# Patient Record
Sex: FEMALE | Race: WHITE | Employment: PART TIME | URBAN - METROPOLITAN AREA
[De-identification: names, ages, dates, MRNs, and addresses within clinical notes are randomized per-mention and may not be internally consistent; named-entity substitution may affect disease eponyms.]

---

## 2023-11-17 ENCOUNTER — OFFICE VISIT (OUTPATIENT)
Age: 28
End: 2023-11-17

## 2023-11-17 VITALS
WEIGHT: 220 LBS | HEART RATE: 94 BPM | OXYGEN SATURATION: 98 % | TEMPERATURE: 98.2 F | SYSTOLIC BLOOD PRESSURE: 121 MMHG | DIASTOLIC BLOOD PRESSURE: 88 MMHG | RESPIRATION RATE: 16 BRPM

## 2023-11-17 DIAGNOSIS — J02.9 VIRAL PHARYNGITIS: Primary | ICD-10-CM

## 2023-11-17 LAB — S PYO AG THROAT QL: NORMAL

## 2023-11-17 NOTE — PROGRESS NOTES
Sinus: No maxillary sinus tenderness or frontal sinus tenderness. Mouth/Throat:      Mouth: Mucous membranes are moist.      Dentition: No gingival swelling. Pharynx: No oropharyngeal exudate or posterior oropharyngeal erythema. Tonsils: No tonsillar exudate. Eyes:      Pupils: Pupils are equal, round, and reactive to light. Cardiovascular:      Rate and Rhythm: Normal rate and regular rhythm. Heart sounds: No murmur heard. Pulmonary:      Effort: Pulmonary effort is normal. No respiratory distress. Breath sounds: No stridor. No wheezing, rhonchi or rales. Abdominal:      Tenderness: There is no guarding. Musculoskeletal:      Cervical back: Normal range of motion and neck supple. Skin:     General: Skin is warm. Neurological:      Mental Status: She is alert and oriented to person, place, and time. Psychiatric:         Mood and Affect: Mood normal.         Behavior: Behavior normal.         An electronic signature was used to authenticate this note.     Eliseo Garcia, KARL - CNP

## 2023-11-17 NOTE — PATIENT INSTRUCTIONS
Shamika Bautista,    It was an absolute pleasure taking care of you today. I'm hoping you'll start feeling better as soon as possible. Please call me if you have any questions or concerns about what we talked about today. Feel free stop back in if anything changes or things seem to be getting worse. If for some reason you develop any serious or potentially life-threatening issues, call 911 or proceed to the nearest emergency department. Hopefully it will never come to that. Otherwise, it was very nice to meet you Shamika Bautista.       Thanks,     Carlos Stone